# Patient Record
Sex: MALE | URBAN - METROPOLITAN AREA
[De-identification: names, ages, dates, MRNs, and addresses within clinical notes are randomized per-mention and may not be internally consistent; named-entity substitution may affect disease eponyms.]

---

## 2018-01-17 ENCOUNTER — APPOINTMENT (RX ONLY)
Dept: URBAN - METROPOLITAN AREA CLINIC 76 | Facility: CLINIC | Age: 23
Setting detail: DERMATOLOGY
End: 2018-01-17

## 2018-01-17 VITALS — HEIGHT: 71 IN | WEIGHT: 180 LBS

## 2018-01-17 DIAGNOSIS — L70.0 ACNE VULGARIS: ICD-10-CM

## 2018-01-17 PROCEDURE — ? IN-HOUSE DISPENSING PHARMACY

## 2018-01-17 PROCEDURE — ? PRESCRIPTION

## 2018-01-17 PROCEDURE — 99202 OFFICE O/P NEW SF 15 MIN: CPT

## 2018-01-17 PROCEDURE — ? MEDICATION COUNSELING

## 2018-01-17 PROCEDURE — ? COUNSELING

## 2018-01-17 PROCEDURE — ? TREATMENT REGIMEN

## 2018-01-17 RX ORDER — MINOCYCLINE HYDROCHLORIDE 100 MG/1
CAPSULE ORAL
Qty: 60 | Refills: 2 | COMMUNITY
Start: 2018-01-17

## 2018-01-17 RX ADMIN — MINOCYCLINE HYDROCHLORIDE: 100 CAPSULE ORAL at 17:25

## 2018-01-17 ASSESSMENT — LOCATION ZONE DERM
LOCATION ZONE: TRUNK
LOCATION ZONE: FACE
LOCATION ZONE: NECK

## 2018-01-17 ASSESSMENT — LOCATION SIMPLE DESCRIPTION DERM
LOCATION SIMPLE: CHEST
LOCATION SIMPLE: LEFT CHEEK
LOCATION SIMPLE: POSTERIOR NECK

## 2018-01-17 ASSESSMENT — LOCATION DETAILED DESCRIPTION DERM
LOCATION DETAILED: LEFT INFERIOR CENTRAL MALAR CHEEK
LOCATION DETAILED: MID POSTERIOR NECK
LOCATION DETAILED: LEFT MEDIAL INFERIOR CHEST

## 2018-01-17 ASSESSMENT — SEVERITY ASSESSMENT OVERALL AMONG ALL PATIENTS
IN YOUR EXPERIENCE, AMONG ALL PATIENTS YOU HAVE SEEN WITH THIS CONDITION, HOW SEVERE IS THIS PATIENT'S CONDITION?: INFLAMMATORY LESIONS MORE APPARENT; MANY COMEDONES AND PAPULES/PUSTULES, +/- FEW NODULOCYSTIC LESIONS

## 2018-01-17 NOTE — PROCEDURE: IN-HOUSE DISPENSING PHARMACY
Product 25 Units Dispensed: 0
Product 38 Unit Type: mg
Product 42 Amount/Unit (Numbers Only): 60
Name Of Product 52: [id number]- Iodoquin / Neeraj Combo\\nAloe Vera 0.5% / Desonide 0.05%/ Iodoquinol 1%
Product 1 Refills: 11
Product 52 Unit Type: ml
Product 41 Amount/Unit (Numbers Only): 30
Name Of Product 12: [id number]- Kojic Melasma Cream\\nAscorbyl Palmitate 1%/ Kojic Acid 4%/ Lactic Acid 5%/ Potassium Azeloyl Diglycinate 10%
Product 43 Application Directions: Apply topically twice daily to active areas as directed.
Product 13 Refills: 2
Name Of Product 21: [id number]- Ivermec 1% / Met 1% Gel\\nIvermectin 1% / Metronidazole 1% / Niacinamide 4% / Potassium Azeloyl Diglycinate 5%
Product 42 Refills: 3
Product 42 Application Directions: Apply topically twice daily to active areas as directed
Product 8 Application Directions: Apply to affected area in the evening after moisturizer. Avoid eyelids.
Name Of Product 7: [id number]- Ildefonso/ Clind Combo Gel \\nBenxoyl Peroxide 5% / Clindamycin Phosphate 1%/ Niacinamide 2%
Product 7 Application Directions: Apply to affected area one time a day in morning.
Product 8 Refills: 11
Detail Level: Zone
Product 14 Rehman/Unit (In Dollars): 40.00
Send Charges To Patient Encounter: Yes
Product 3 Application Directions: Wash topically to acne prone areas 1-2 times daily
Name Of Product 31: [id number]- Imiqui 5% / Levo 1% Gel\\nImiquimod 5% / Levocetirizine 1% / Niacinamide 2%
Product 3 Amount/Unit (Numbers Only): 60
Product 52 Amount/Unit (Numbers Only): 30
Name Of Product 5: 249262- Acne Body Wash\\nSodium sulfacetamide 8% / Sulfur 4%
Name Of Product 11: 374257- Hydroquin 6% Combo Cream\\nHydroquinone 6%/ Tretinoin 0.05% / Traimcinolone Acetonide 0.025%
Product 31 Application Directions: Apply topically once daily as directed
Product 8 Price/Unit (In Dollars): 50.00
Name Of Product 6: 526852- Taza 0.1% Cream\\nNiacinamide 2% / Tazarotene 0.1%
Product 4 Application Directions: Apply topically to acne prone areas once daily.
Product 8 Units Dispensed: 1
Name Of Product 41: [id number]- Clob 0.05% Cream\\nClobetasol Propionate 0.05% / Niacinamide 2 %
Name Of Product 51: 537806- Triamcin 1% Ointment \\nCalcipotriene 0.005% /  Triamcinolone Acetonide 0.1%
Product 43 Unit Type: grams
Name Of Product 53: 090038- Tacro 0.1% Ointment\\nNiacinamide 4% / Tacrolimus 0.1%
Name Of Product 2: 882041- Tret 0.05% Cream\\n Tretinoin 0.05% / Niacinamide 2%
Product 13 Price/Unit (In Dollars): 45.00
Product 1 Application Directions: apply topically to acne prone areas once daily at bedtime
Product 5 Amount/Unit (Numbers Only): 120
Product 2 Application Directions: Apply to affected area in the evening after moisturizer.  Avoid eyelids.
Product 12 Application Directions: .Apply to affected areas once daily.
Name Of Product 42: [id number]- Clob 0.05% Solution 60 mL\\nClobetasol Propionate 0.05% / Niacinamide 4%
Name Of Product 4: 062202- Acne Gel w/Dapsone\\nDapsone 7.5%/ Niacinamide 2%
Name Of Product 43: 304015- Clob 0.05% Ointment 60gm
Name Of Product 1: 895057- Clind/Tret Combo Cream\\n clindamycin phosphate 1%/ Niacinamide 2%/ Tretinoin 0.025%
Product 11 Application Directions: Apply to affected areas once daily; take one month break after every 2 months of use.
Product 21 Application Directions: Apply to affected area once or twice daily.
Product 6 Application Directions: Apply topically to acne prone areas once daily at bedtime.
Name Of Product 8: [id number]- Adap Combo Cream\\nAdapalene 0.3% / Benzoyl Peroxide 2.5% / Niacinamide 2%
Product 33 Application Directions: Apply topically to nails and nail folds once daily
Product 33 Amount/Unit (Numbers Only): 15
Name Of Product 33: [id number]- Anti-fungal Nail Solution\\nTerbinafine 5% / DMSO 95%
Name Of Product 3: 500552- Sod Sulf 10% / Sulfur 2%\\nSodium Sulfacetamide 10%/ Sulfur 2%

## 2018-01-17 NOTE — PROCEDURE: TREATMENT REGIMEN
Plan: Patient to start the following: avene acne cleanser and hydrating cream. He will start 3-month trial of minocycline 100mg BID and topical BP/adapalene once daily. Discussed possible future option of isotretinoin (patient declines at this time).
Detail Level: Simple

## 2018-06-07 ENCOUNTER — APPOINTMENT (RX ONLY)
Dept: URBAN - METROPOLITAN AREA CLINIC 76 | Facility: CLINIC | Age: 23
Setting detail: DERMATOLOGY
End: 2018-06-07

## 2018-06-07 DIAGNOSIS — L70.0 ACNE VULGARIS: ICD-10-CM

## 2018-06-07 PROCEDURE — 99213 OFFICE O/P EST LOW 20 MIN: CPT

## 2018-06-07 PROCEDURE — ? TREATMENT REGIMEN

## 2018-06-07 PROCEDURE — ? COUNSELING

## 2018-06-07 PROCEDURE — ? PRESCRIPTION

## 2018-06-07 PROCEDURE — ? IN-HOUSE DISPENSING PHARMACY

## 2018-06-07 PROCEDURE — ? MEDICATION COUNSELING

## 2018-06-07 RX ORDER — MINOCYCLINE HYDROCHLORIDE 100 MG/1
CAPSULE ORAL
Qty: 60 | Refills: 3 | Status: ERX

## 2018-06-07 ASSESSMENT — LOCATION DETAILED DESCRIPTION DERM
LOCATION DETAILED: MID POSTERIOR NECK
LOCATION DETAILED: LEFT INFERIOR CENTRAL MALAR CHEEK
LOCATION DETAILED: LEFT MEDIAL INFERIOR CHEST

## 2018-06-07 ASSESSMENT — LOCATION ZONE DERM
LOCATION ZONE: NECK
LOCATION ZONE: FACE
LOCATION ZONE: TRUNK

## 2018-06-07 ASSESSMENT — LOCATION SIMPLE DESCRIPTION DERM
LOCATION SIMPLE: LEFT CHEEK
LOCATION SIMPLE: CHEST
LOCATION SIMPLE: POSTERIOR NECK

## 2018-06-07 NOTE — PROCEDURE: TREATMENT REGIMEN
Plan: Patient to continue the following: avene acne cleanser and hydrating cream. He will continue the minocycline for up 3 more months (I discussed that further treatment with minocycline would not be warranted to avoid excessive antibiotic use) and continue the BP/adapalene once daily. Discussed again the possible option of isotretinoin if he flares off of minocycline. I discussed that no further prescriptions for minocycline will be written.
Detail Level: Simple

## 2024-11-03 NOTE — PROCEDURE: IN-HOUSE DISPENSING PHARMACY
Product 43 Unit Type: grams
Product 63 Amount/Unit (Numbers Only): 0
Product 10 Unit Type: mg
Product 11 Amount/Unit (Numbers Only): 30
Product 12 Application Directions: .Apply to affected areas once daily.
Product 4 Refills: 11
[Normal Rate] : normal rate 
Product 3 Refills: 11
[Regular Rhythm] : with a regular rhythm
Product 33 Price/Unit (In Dollars): 40.00
[No Murmur] : no murmur heard
Product 31 Amount/Unit (Numbers Only): 30
[No Edema] : there was no peripheral edema
[Normal Sphincter Tone] : normal sphincter tone
[No Mass] : no mass
Product 41 Unit Type: ml
[Penis Abnormality] : normal circumcised penis
Name Of Product 51: [id number]- Triamcin 1% Ointment \\nCalcipotriene 0.005% /  Triamcinolone Acetonide 0.1%
[Prostate Enlargement] : the prostate was not enlarged
Product 31 Refills: 1
[Prostate Tenderness] : the prostate was not tender
[No Prostate Nodules] : no prostate nodules
Product 13 Refills: 2
[Normal] : no posterior cervical lymphadenopathy and no anterior cervical lymphadenopathy
Name Of Product 11: [id number]- Hydroquin 6% Combo Cream\\nHydroquinone 6%/ Tretinoin 0.05% / Traimcinolone Acetonide 0.025%
[de-identified] : Large left inguinal hernia
Product 4 Price/Unit (In Dollars): 50.00
Product 5 Application Directions: Wash topically to acne prone areas 1-2 times daily
Name Of Product 8: 439714- Adap Combo Cream\\nAdapalene 0.3% / Benzoyl Peroxide 2.5% / Niacinamide 2%
Product 42 Amount/Unit (Numbers Only): 60
Product 13 Amount/Unit (Numbers Only): 60
Product 41 Application Directions: Apply topically twice daily to active areas as directed
Name Of Product 21: [id number]- Ivermec 1% / Met 1% Gel\\nIvermectin 1% / Metronidazole 1% / Niacinamide 4% / Potassium Azeloyl Diglycinate 5%
Send Charges To Patient Encounter: Yes
Product 43 Refills: 3
Product 21 Application Directions: Apply to affected area once or twice daily.
Product 5 Amount/Unit (Numbers Only): 120
Product 13 Price/Unit (In Dollars): 45.00
Name Of Product 41: [id number]- Clob 0.05% Cream\\nClobetasol Propionate 0.05% / Niacinamide 2 %
Name Of Product 52: 978614- Iodoquin / Neeraj Combo\\nAloe Vera 0.5% / Desonide 0.05%/ Iodoquinol 1%
Name Of Product 3: [id number]- Sod Sulf 10% / Sulfur 2%\\nSodium Sulfacetamide 10%/ Sulfur 2%
Name Of Product 2: 656760- Tret 0.05% Cream\\n Tretinoin 0.05% / Niacinamide 2%
Name Of Product 12: 616214- Azelaic Cream\\nNiacinamide 4% / Azelaic Acid 15%
Name Of Product 6: 491490- Taza 0.1% Cream\\nNiacinamide 2% / Tazarotene 0.1%
Product 31 Application Directions: Apply topically once daily as directed
Product 33 Amount/Unit (Numbers Only): 15
Product 11 Application Directions: Apply to affected areas once daily; take one month break after every 2 months of use.
Name Of Product 1: 270951- Clind/Tret Combo Cream\\n clindamycin phosphate 1%/ Niacinamide 2%/ Tretinoin 0.025%
Name Of Product 5: 923682- Acne Body Wash\\nSodium sulfacetamide 8% / Sulfur 4%
Name Of Product 43: 868996- Clob 0.05% Ointment 60gm
Product 4 Application Directions: Apply topically to acne prone areas once daily.
Product 43 Application Directions: Apply topically twice daily to active areas as directed.
Product 1 Application Directions: apply topically to acne prone areas once daily at bedtime
Product 8 Application Directions: Apply to affected area in the evening after moisturizer. Avoid eyelids.
Name Of Product 7: 837288- Ildefonso/ Clind Combo Gel \\nBenxoyl Peroxide 5% / Clindamycin Phosphate 1%/ Niacinamide 2%
Name Of Product 42: 683259- Clob 0.05% Solution 60 mL\\nClobetasol Propionate 0.05% / Niacinamide 4%
Product 7 Application Directions: Apply to affected area one time a day in morning.
Name Of Product 31: 329322- Imiqui 5% / Levo 1% Gel\\nImiquimod 5% / Levocetirizine 1% / Niacinamide 2%
Detail Level: Zone
Name Of Product 4: 663574- Acne Gel w/Dapsone\\nDapsone 7.5%/ Niacinamide 2%
Product 2 Application Directions: Apply to affected area in the evening after moisturizer.  Avoid eyelids.
Product 33 Application Directions: Apply topically to nails and nail folds once daily
Name Of Product 33: 469051- Anti-fungal Nail Solution\\nTerbinafine 5% / DMSO 95%
Name Of Product 53: 727502- Tacro 0.1% Ointment\\nNiacinamide 4% / Tacrolimus 0.1%
Product 6 Application Directions: Apply topically to acne prone areas once daily at bedtime.
Yes